# Patient Record
Sex: FEMALE | Race: WHITE | NOT HISPANIC OR LATINO | URBAN - NONMETROPOLITAN AREA
[De-identification: names, ages, dates, MRNs, and addresses within clinical notes are randomized per-mention and may not be internally consistent; named-entity substitution may affect disease eponyms.]

---

## 2018-06-11 PROBLEM — H43.813: Noted: 2018-06-11

## 2018-06-11 PROBLEM — Z96.1: Noted: 2018-06-11

## 2018-06-11 PROBLEM — H02.831: Noted: 2019-06-10

## 2018-06-11 PROBLEM — H26.491: Noted: 2018-06-11

## 2018-06-11 PROBLEM — H16.223: Noted: 2018-06-11

## 2018-06-11 PROBLEM — H02.834: Noted: 2019-06-10

## 2018-06-11 PROBLEM — H18.51: Noted: 2018-06-11

## 2019-06-10 ENCOUNTER — IMPORTED ENCOUNTER (OUTPATIENT)
Dept: URBAN - NONMETROPOLITAN AREA CLINIC 1 | Facility: CLINIC | Age: 84
End: 2019-06-10

## 2019-06-10 PROBLEM — H02.834: Noted: 2019-06-10

## 2019-06-10 PROBLEM — H16.223: Noted: 2018-06-11

## 2019-06-10 PROBLEM — H43.813: Noted: 2018-06-11

## 2019-06-10 PROBLEM — H02.831: Noted: 2019-06-10

## 2019-06-10 PROBLEM — Z96.1: Noted: 2018-06-11

## 2019-06-10 PROBLEM — H26.491: Noted: 2018-06-11

## 2019-06-10 PROCEDURE — 92012 INTRM OPH EXAM EST PATIENT: CPT

## 2019-06-10 PROCEDURE — 92015 DETERMINE REFRACTIVE STATE: CPT

## 2019-06-10 NOTE — PATIENT DISCUSSION
Pseudophakia OU with trace PCO OD - Discussed diagnosis in detail with patient- PCO noted OD but stable and no treatment needed at this time- Continue to monitorDES OU / [place] OU- Discussed diagnosis in detail with patient- Discussed signs and symptoms of progression- Recommend drinking plenty of water and starting Omega 3's - Recommend Refresh or systane through out the day. Samples of Systane complete given today- Continue to monitorDermatochalasis OU- Discussed diagnosis in detail with patient- No superior field of vision loss- Continue to monitorPVD OU- Discussed findings of exam in detail with the patient. - The risk of retinal detachment in patients with PVDs was discussed with the patient and the warning signs of retinal detachment were carefully reviewed with the patient. - The patient was warned to return to the office or contact the ophthalmologist on call immediately if they experience signs of retinal detachment. - Continue to monitorPResbyopia OU - Discussed diagnosis in detail with patient - New glasses Rx given today- Continue to monitor

## 2020-06-17 ENCOUNTER — IMPORTED ENCOUNTER (OUTPATIENT)
Dept: URBAN - NONMETROPOLITAN AREA CLINIC 1 | Facility: CLINIC | Age: 85
End: 2020-06-17

## 2020-06-17 PROCEDURE — 92014 COMPRE OPH EXAM EST PT 1/>: CPT

## 2020-06-17 PROCEDURE — 92015 DETERMINE REFRACTIVE STATE: CPT

## 2020-06-17 NOTE — PATIENT DISCUSSION
Pseudophakia OU with trace PCO OD - Discussed diagnosis in detail with patient- PCO noted OD but stable and no treatment needed at this time- Continue to monitor Hold RxDES OU / Hassie Adas OU- Discussed diagnosis in detail with patient- Discussed signs and symptoms of progression- Recommend drinking plenty of water and starting Omega 3's - Recommend Refresh or systane through out the day. Samples of Systane complete given today- Continue to monitorDermatochalasis OU- Discussed diagnosis in detail with patient- No superior field of vision loss- Continue to monitorPVD OU- Discussed findings of exam in detail with the patient. - The risk of retinal detachment in patients with PVDs was discussed with the patient and the warning signs of retinal detachment were carefully reviewed with the patient. - The patient was warned to return to the office or contact the ophthalmologist on call immediately if they experience signs of retinal detachment. - Continue to monitorPresbyopia OU - Discussed diagnosis in detail with patient - New glasses Rx given today- Continue to monitor

## 2021-06-23 ENCOUNTER — IMPORTED ENCOUNTER (OUTPATIENT)
Dept: URBAN - NONMETROPOLITAN AREA CLINIC 1 | Facility: CLINIC | Age: 86
End: 2021-06-23

## 2021-06-23 PROCEDURE — 92015 DETERMINE REFRACTIVE STATE: CPT

## 2021-06-23 PROCEDURE — 92014 COMPRE OPH EXAM EST PT 1/>: CPT

## 2021-06-23 NOTE — PATIENT DISCUSSION
Pseudophakia OU with trace PCO OD - Discussed diagnosis in detail with patient- PCO noted OD but stable and no treatment needed at this time- Continue to monitor- RTC 1 year complete with BAT OSIRIS OU / Corrie Crow OU- Discussed diagnosis in detail with patient- Discussed signs and symptoms of progression- Recommend drinking plenty of water and starting Omega 3's - Recommend Refresh or systane through out the day. Samples of Systane complete given today- Continue to monitorDermatochalasis OU- Discussed diagnosis in detail with patient- No superior field of vision loss- Continue to monitorPVD OU- Discussed findings of exam in detail with the patient. - The risk of retinal detachment in patients with PVDs was discussed with the patient and the warning signs of retinal detachment were carefully reviewed with the patient. - The patient was warned to return to the office or contact the ophthalmologist on call immediately if they experience signs of retinal detachment. - Continue to monitorPresbyopia OU - Discussed diagnosis in detail with patient - New glasses Rx given today- Continue to monitor

## 2021-07-08 PROBLEM — Z96.1: Noted: 2018-06-11

## 2021-07-08 PROBLEM — H02.831: Noted: 2019-06-10

## 2021-07-08 PROBLEM — H26.491: Noted: 2018-06-11

## 2021-07-08 PROBLEM — H16.223: Noted: 2018-06-11

## 2021-07-08 PROBLEM — H43.813: Noted: 2018-06-11

## 2021-07-08 PROBLEM — H18.513: Noted: 2021-07-08

## 2022-04-09 ASSESSMENT — TONOMETRY
OS_IOP_MMHG: 10
OD_IOP_MMHG: 14
OD_IOP_MMHG: 09
OD_IOP_MMHG: 10
OS_IOP_MMHG: 14
OS_IOP_MMHG: 10

## 2022-04-09 ASSESSMENT — VISUAL ACUITY
OS_SC: 20/25-2
OD_SC: 20/20-2
OS_GLARE: 20/30+
OS_SC: 20/30
OD_CC: J1
OS_CC: J1
OD_GLARE: 20/40-
OD_SC: 20/30
OS_SC: 20/25-
OD_SC: 20/20-

## 2022-09-23 ENCOUNTER — ESTABLISHED PATIENT (OUTPATIENT)
Dept: URBAN - NONMETROPOLITAN AREA CLINIC 1 | Facility: CLINIC | Age: 87
End: 2022-09-23

## 2022-09-23 DIAGNOSIS — H52.4: ICD-10-CM

## 2022-09-23 DIAGNOSIS — H26.491: ICD-10-CM

## 2022-09-23 DIAGNOSIS — Z96.1: ICD-10-CM

## 2022-09-23 DIAGNOSIS — H16.223: ICD-10-CM

## 2022-09-23 PROCEDURE — 99213 OFFICE O/P EST LOW 20 MIN: CPT

## 2022-09-23 PROCEDURE — 92015 DETERMINE REFRACTIVE STATE: CPT

## 2022-09-23 ASSESSMENT — VISUAL ACUITY
OD_SC: 20/25
OS_PH: 20/25
OS_SC: 20/60

## 2022-09-23 ASSESSMENT — TONOMETRY
OD_IOP_MMHG: 11
OS_IOP_MMHG: 11

## 2022-09-23 NOTE — PATIENT DISCUSSION
Discussed diagnosis in detail with patient. Discussed signs and symptoms of progression. Recommend drinking plenty of water and starting Omega 3's. Recommend Refresh or systane through out the day. Samples of Systane complete given today. Continue to monitor.

## 2022-09-23 NOTE — PATIENT DISCUSSION
Discussed diagnosis in detail with patient.  PCO noted OD but stable and no treatment needed at this time.  Continue to monitor.

## 2023-10-18 ENCOUNTER — ESTABLISHED PATIENT (OUTPATIENT)
Dept: URBAN - NONMETROPOLITAN AREA CLINIC 1 | Facility: CLINIC | Age: 88
End: 2023-10-18

## 2023-10-18 DIAGNOSIS — H16.223: ICD-10-CM

## 2023-10-18 DIAGNOSIS — H52.4: ICD-10-CM

## 2023-10-18 DIAGNOSIS — H02.834: ICD-10-CM

## 2023-10-18 DIAGNOSIS — Z96.1: ICD-10-CM

## 2023-10-18 DIAGNOSIS — H43.813: ICD-10-CM

## 2023-10-18 DIAGNOSIS — H26.491: ICD-10-CM

## 2023-10-18 DIAGNOSIS — H02.831: ICD-10-CM

## 2023-10-18 PROCEDURE — 99213 OFFICE O/P EST LOW 20 MIN: CPT

## 2023-10-18 ASSESSMENT — VISUAL ACUITY
OD_PH: 20/50
OS_CC: 20/40
OU_CC: 20/40
OD_CC: 20/60-1

## 2023-10-18 ASSESSMENT — TONOMETRY
OS_IOP_MMHG: 13
OD_IOP_MMHG: 13

## 2024-10-21 ENCOUNTER — FOLLOW UP (OUTPATIENT)
Dept: URBAN - NONMETROPOLITAN AREA CLINIC 1 | Facility: CLINIC | Age: 89
End: 2024-10-21

## 2024-10-21 DIAGNOSIS — H02.831: ICD-10-CM

## 2024-10-21 DIAGNOSIS — H43.813: ICD-10-CM

## 2024-10-21 DIAGNOSIS — H02.834: ICD-10-CM

## 2024-10-21 DIAGNOSIS — H16.223: ICD-10-CM

## 2024-10-21 PROCEDURE — 99213 OFFICE O/P EST LOW 20 MIN: CPT

## 2024-10-21 PROCEDURE — 92250 FUNDUS PHOTOGRAPHY W/I&R: CPT
